# Patient Record
Sex: FEMALE | Race: WHITE | NOT HISPANIC OR LATINO | Employment: FULL TIME | URBAN - NONMETROPOLITAN AREA
[De-identification: names, ages, dates, MRNs, and addresses within clinical notes are randomized per-mention and may not be internally consistent; named-entity substitution may affect disease eponyms.]

---

## 2023-05-14 DIAGNOSIS — F32.9 MAJOR DEPRESSIVE DISORDER, SINGLE EPISODE, UNSPECIFIED: ICD-10-CM

## 2023-05-14 DIAGNOSIS — R05.9 COUGH, UNSPECIFIED TYPE: ICD-10-CM

## 2023-05-14 DIAGNOSIS — E78.00 PURE HYPERCHOLESTEROLEMIA, UNSPECIFIED: ICD-10-CM

## 2023-05-15 RX ORDER — ROSUVASTATIN CALCIUM 5 MG/1
TABLET, COATED ORAL
Qty: 30 TABLET | Refills: 0 | Status: SHIPPED | OUTPATIENT
Start: 2023-05-15 | End: 2023-06-12

## 2023-05-15 RX ORDER — CITALOPRAM 20 MG/1
TABLET, FILM COATED ORAL
Qty: 30 TABLET | Refills: 0 | Status: SHIPPED | OUTPATIENT
Start: 2023-05-15 | End: 2023-06-12

## 2023-05-22 RX ORDER — BENZONATATE 200 MG/1
200 CAPSULE ORAL 3 TIMES DAILY PRN
COMMUNITY
End: 2023-05-22 | Stop reason: SDUPTHER

## 2023-05-23 RX ORDER — BENZONATATE 200 MG/1
200 CAPSULE ORAL 3 TIMES DAILY PRN
Qty: 20 CAPSULE | Refills: 0 | Status: SHIPPED | OUTPATIENT
Start: 2023-05-23 | End: 2023-08-07 | Stop reason: ALTCHOICE

## 2023-05-25 ENCOUNTER — TELEPHONE (OUTPATIENT)
Dept: PRIMARY CARE | Facility: CLINIC | Age: 63
End: 2023-05-25
Payer: COMMERCIAL

## 2023-05-25 DIAGNOSIS — R05.1 ACUTE COUGH: Primary | ICD-10-CM

## 2023-05-25 RX ORDER — AZITHROMYCIN 250 MG/1
TABLET, FILM COATED ORAL
Qty: 6 TABLET | Refills: 0 | Status: SHIPPED | OUTPATIENT
Start: 2023-05-25 | End: 2023-08-07 | Stop reason: ALTCHOICE

## 2023-06-27 DIAGNOSIS — E11.9 TYPE 2 DIABETES MELLITUS WITHOUT COMPLICATIONS (MULTI): ICD-10-CM

## 2023-06-27 RX ORDER — METFORMIN HYDROCHLORIDE 500 MG/1
TABLET ORAL
Qty: 180 TABLET | Refills: 0 | Status: SHIPPED | OUTPATIENT
Start: 2023-06-27 | End: 2023-09-27

## 2023-07-14 DIAGNOSIS — R06.00 DYSPNEA, UNSPECIFIED TYPE: ICD-10-CM

## 2023-07-14 RX ORDER — BUDESONIDE AND FORMOTEROL FUMARATE DIHYDRATE 160; 4.5 UG/1; UG/1
2 AEROSOL RESPIRATORY (INHALATION)
Qty: 1 EACH | Refills: 2 | Status: SHIPPED | OUTPATIENT
Start: 2023-07-14 | End: 2023-10-18

## 2023-08-01 DIAGNOSIS — K21.9 GASTRO-ESOPHAGEAL REFLUX DISEASE WITHOUT ESOPHAGITIS: ICD-10-CM

## 2023-08-01 RX ORDER — OMEPRAZOLE 40 MG/1
40 CAPSULE, DELAYED RELEASE ORAL DAILY
Qty: 30 CAPSULE | Refills: 0 | Status: SHIPPED | OUTPATIENT
Start: 2023-08-01 | End: 2023-08-25

## 2023-08-02 NOTE — PROGRESS NOTES
Subjective   Patient ID:   Angela Boyce is a 62 y.o. female who presents for Leg Pain (Back of left leg worse at night, goes up leg into hip ).  HPI  Here with acute symptoms:  Symptoms x multiple months.  Left calf pains  Worse at night.  Was given Meloxicam without relief by urgent care.  Wakes her up at night.  Feels like muscle cramping.  Hurts to walk for a bit.    Review of Systems  12 point review of systems negative unless stated above in HPI    Vitals:    08/07/23 1524   BP: 121/80   Pulse: 93   Resp: 18   SpO2: 95%     Physical Exam  General: Alert and oriented, well nourished, no acute distress.  Lungs: Clear to auscultation, non-labored respiration.  Heart: Normal rate, regular rhythm, no murmur, gallop or edema.  Neurologic: Awake, alert, and oriented X3, CN II-XII intact.  Psychiatric: Cooperative, appropriate mood and affect.    Assessment/Plan   This appears to be a muscle cramp.  Please check your multivitamin at home and make sure you are getting potassium and magnesium.  I have sent in Flexeril to help with this.  Consider PT.  If symptoms persist or worsen despite current plan of care, please contact your healthcare provider for further evaluation.  Patient instructed to contact the office if there are any questions regarding their care or treatment.   [facility] [phone].  [facility] [phone].    Fu 4 months  Diagnoses and all orders for this visit:  Leg cramps  -     cyclobenzaprine (Flexeril) 10 mg tablet; Take 1 tablet (10 mg) by mouth as needed at bedtime for muscle spasms.  Muscle spasm of left lower extremity

## 2023-08-07 ENCOUNTER — OFFICE VISIT (OUTPATIENT)
Dept: PRIMARY CARE | Facility: CLINIC | Age: 63
End: 2023-08-07
Payer: COMMERCIAL

## 2023-08-07 VITALS
WEIGHT: 232.4 LBS | DIASTOLIC BLOOD PRESSURE: 80 MMHG | HEIGHT: 67 IN | SYSTOLIC BLOOD PRESSURE: 121 MMHG | RESPIRATION RATE: 18 BRPM | HEART RATE: 93 BPM | BODY MASS INDEX: 36.47 KG/M2 | OXYGEN SATURATION: 95 %

## 2023-08-07 DIAGNOSIS — M62.838 MUSCLE SPASM OF LEFT LOWER EXTREMITY: ICD-10-CM

## 2023-08-07 DIAGNOSIS — R25.2 LEG CRAMPS: Primary | ICD-10-CM

## 2023-08-07 PROBLEM — K62.5 BLEEDING PER RECTUM: Status: RESOLVED | Noted: 2023-08-07 | Resolved: 2023-08-07

## 2023-08-07 PROBLEM — E66.8 MODERATE OBESITY: Status: ACTIVE | Noted: 2023-08-07

## 2023-08-07 PROBLEM — E78.00 HYPERCHOLESTEROLEMIA: Status: ACTIVE | Noted: 2023-08-07

## 2023-08-07 PROBLEM — R07.89 CHEST TIGHTNESS: Status: RESOLVED | Noted: 2023-08-07 | Resolved: 2023-08-07

## 2023-08-07 PROBLEM — E66.9 MODERATE OBESITY: Status: ACTIVE | Noted: 2023-08-07

## 2023-08-07 PROBLEM — R73.9 HYPERGLYCEMIA: Status: ACTIVE | Noted: 2023-08-07

## 2023-08-07 PROBLEM — U07.1 COVID-19: Status: RESOLVED | Noted: 2023-08-07 | Resolved: 2023-08-07

## 2023-08-07 PROBLEM — J45.30 MILD PERSISTENT ASTHMA WITHOUT COMPLICATION (HHS-HCC): Status: ACTIVE | Noted: 2023-08-07

## 2023-08-07 PROBLEM — K21.9 ESOPHAGEAL REFLUX: Status: RESOLVED | Noted: 2023-08-07 | Resolved: 2023-08-07

## 2023-08-07 PROBLEM — L02.419 ABSCESS OF AXILLA: Status: RESOLVED | Noted: 2023-08-07 | Resolved: 2023-08-07

## 2023-08-07 PROBLEM — J30.9 ALLERGIC RHINITIS: Status: RESOLVED | Noted: 2023-08-07 | Resolved: 2023-08-07

## 2023-08-07 PROBLEM — R42 DIZZINESS: Status: RESOLVED | Noted: 2023-08-07 | Resolved: 2023-08-07

## 2023-08-07 PROBLEM — L03.116 CELLULITIS OF KNEE, LEFT: Status: RESOLVED | Noted: 2023-08-07 | Resolved: 2023-08-07

## 2023-08-07 PROBLEM — J45.909 ALLERGIC BRONCHITIS (HHS-HCC): Status: RESOLVED | Noted: 2023-08-07 | Resolved: 2023-08-07

## 2023-08-07 PROBLEM — I10 UNCONTROLLED HYPERTENSION: Status: ACTIVE | Noted: 2023-08-07

## 2023-08-07 PROBLEM — R79.89 ABNORMAL LFTS: Status: ACTIVE | Noted: 2023-08-07

## 2023-08-07 PROBLEM — F32.A DEPRESSION: Status: ACTIVE | Noted: 2023-08-07

## 2023-08-07 PROBLEM — R00.0 TACHYCARDIA: Status: RESOLVED | Noted: 2023-08-07 | Resolved: 2023-08-07

## 2023-08-07 PROBLEM — J45.909 ASTHMA (HHS-HCC): Status: ACTIVE | Noted: 2023-08-07

## 2023-08-07 PROBLEM — R06.02 SOB (SHORTNESS OF BREATH): Status: ACTIVE | Noted: 2023-08-07

## 2023-08-07 PROCEDURE — 1036F TOBACCO NON-USER: CPT | Performed by: PHYSICIAN ASSISTANT

## 2023-08-07 PROCEDURE — 3079F DIAST BP 80-89 MM HG: CPT | Performed by: PHYSICIAN ASSISTANT

## 2023-08-07 PROCEDURE — 99213 OFFICE O/P EST LOW 20 MIN: CPT | Performed by: PHYSICIAN ASSISTANT

## 2023-08-07 PROCEDURE — 3074F SYST BP LT 130 MM HG: CPT | Performed by: PHYSICIAN ASSISTANT

## 2023-08-07 RX ORDER — CYCLOBENZAPRINE HCL 10 MG
10 TABLET ORAL NIGHTLY PRN
Qty: 60 TABLET | Refills: 0 | Status: SHIPPED | OUTPATIENT
Start: 2023-08-07 | End: 2023-09-05

## 2023-08-07 RX ORDER — MELOXICAM 15 MG/1
15 TABLET ORAL DAILY
COMMUNITY
Start: 2023-07-03

## 2023-08-07 ASSESSMENT — PATIENT HEALTH QUESTIONNAIRE - PHQ9
SUM OF ALL RESPONSES TO PHQ9 QUESTIONS 1 AND 2: 0
2. FEELING DOWN, DEPRESSED OR HOPELESS: NOT AT ALL
1. LITTLE INTEREST OR PLEASURE IN DOING THINGS: NOT AT ALL

## 2023-08-07 ASSESSMENT — PAIN SCALES - GENERAL: PAINLEVEL: 4

## 2023-08-17 DIAGNOSIS — K21.9 GASTRO-ESOPHAGEAL REFLUX DISEASE WITHOUT ESOPHAGITIS: ICD-10-CM

## 2023-08-17 RX ORDER — OMEPRAZOLE 20 MG/1
CAPSULE, DELAYED RELEASE ORAL
Qty: 90 CAPSULE | Refills: 1 | Status: SHIPPED | OUTPATIENT
Start: 2023-08-17 | End: 2023-11-15 | Stop reason: SDUPTHER

## 2023-09-04 DIAGNOSIS — R25.2 LEG CRAMPS: ICD-10-CM

## 2023-09-05 RX ORDER — CYCLOBENZAPRINE HCL 10 MG
10 TABLET ORAL NIGHTLY PRN
Qty: 60 TABLET | Refills: 0 | Status: SHIPPED | OUTPATIENT
Start: 2023-09-05 | End: 2023-11-21

## 2023-09-08 DIAGNOSIS — E78.00 HYPERCHOLESTEROLEMIA: ICD-10-CM

## 2023-09-08 DIAGNOSIS — F32.9 MAJOR DEPRESSIVE DISORDER, SINGLE EPISODE, UNSPECIFIED: ICD-10-CM

## 2023-09-08 RX ORDER — CITALOPRAM 20 MG/1
TABLET, FILM COATED ORAL
Qty: 90 TABLET | Refills: 0 | Status: SHIPPED | OUTPATIENT
Start: 2023-09-08 | End: 2024-01-02 | Stop reason: SDUPTHER

## 2023-09-21 RX ORDER — ROSUVASTATIN CALCIUM 5 MG/1
5 TABLET, COATED ORAL DAILY
Qty: 90 TABLET | Refills: 0 | Status: SHIPPED | OUTPATIENT
Start: 2023-09-21 | End: 2023-12-18

## 2023-10-18 DIAGNOSIS — R06.00 DYSPNEA, UNSPECIFIED TYPE: ICD-10-CM

## 2023-10-18 RX ORDER — BUDESONIDE AND FORMOTEROL FUMARATE DIHYDRATE 160; 4.5 UG/1; UG/1
2 AEROSOL RESPIRATORY (INHALATION) 2 TIMES DAILY
Qty: 10.2 EACH | Refills: 2 | Status: SHIPPED | OUTPATIENT
Start: 2023-10-18 | End: 2024-01-16

## 2023-11-15 ENCOUNTER — OFFICE VISIT (OUTPATIENT)
Dept: PRIMARY CARE | Facility: CLINIC | Age: 63
End: 2023-11-15
Payer: COMMERCIAL

## 2023-11-15 VITALS
HEART RATE: 101 BPM | BODY MASS INDEX: 36.41 KG/M2 | WEIGHT: 232 LBS | HEIGHT: 67 IN | DIASTOLIC BLOOD PRESSURE: 80 MMHG | RESPIRATION RATE: 16 BRPM | SYSTOLIC BLOOD PRESSURE: 132 MMHG | OXYGEN SATURATION: 97 %

## 2023-11-15 DIAGNOSIS — K21.9 GASTROESOPHAGEAL REFLUX DISEASE WITHOUT ESOPHAGITIS: ICD-10-CM

## 2023-11-15 DIAGNOSIS — J45.909 ASTHMA, UNSPECIFIED ASTHMA SEVERITY, UNSPECIFIED WHETHER COMPLICATED, UNSPECIFIED WHETHER PERSISTENT (HHS-HCC): ICD-10-CM

## 2023-11-15 DIAGNOSIS — M54.50 LOW BACK PAIN, UNSPECIFIED BACK PAIN LATERALITY, UNSPECIFIED CHRONICITY, UNSPECIFIED WHETHER SCIATICA PRESENT: ICD-10-CM

## 2023-11-15 DIAGNOSIS — E01.0 THYROMEGALY: ICD-10-CM

## 2023-11-15 DIAGNOSIS — E11.9 TYPE 2 DIABETES MELLITUS WITHOUT COMPLICATION, UNSPECIFIED WHETHER LONG TERM INSULIN USE (MULTI): ICD-10-CM

## 2023-11-15 DIAGNOSIS — E78.5 DYSLIPIDEMIA: ICD-10-CM

## 2023-11-15 DIAGNOSIS — R73.9 HYPERGLYCEMIA: ICD-10-CM

## 2023-11-15 DIAGNOSIS — F32.A DEPRESSION, UNSPECIFIED DEPRESSION TYPE: ICD-10-CM

## 2023-11-15 PROBLEM — J45.20 MILD INTERMITTENT ASTHMA WITHOUT COMPLICATION (HHS-HCC): Status: ACTIVE | Noted: 2023-11-15

## 2023-11-15 LAB — POC HEMOGLOBIN A1C: 7.5 % (ref 4.2–6.5)

## 2023-11-15 PROCEDURE — 99214 OFFICE O/P EST MOD 30 MIN: CPT | Performed by: INTERNAL MEDICINE

## 2023-11-15 PROCEDURE — 83036 HEMOGLOBIN GLYCOSYLATED A1C: CPT | Performed by: INTERNAL MEDICINE

## 2023-11-15 PROCEDURE — 3079F DIAST BP 80-89 MM HG: CPT | Performed by: INTERNAL MEDICINE

## 2023-11-15 PROCEDURE — 1036F TOBACCO NON-USER: CPT | Performed by: INTERNAL MEDICINE

## 2023-11-15 PROCEDURE — 3075F SYST BP GE 130 - 139MM HG: CPT | Performed by: INTERNAL MEDICINE

## 2023-11-15 RX ORDER — METFORMIN HYDROCHLORIDE 750 MG/1
750 TABLET, EXTENDED RELEASE ORAL
Qty: 30 TABLET | Refills: 11 | Status: SHIPPED | OUTPATIENT
Start: 2023-11-15 | End: 2024-01-31 | Stop reason: SDUPTHER

## 2023-11-15 ASSESSMENT — PAIN SCALES - GENERAL: PAINLEVEL: 6

## 2023-11-15 ASSESSMENT — PATIENT HEALTH QUESTIONNAIRE - PHQ9
SUM OF ALL RESPONSES TO PHQ9 QUESTIONS 1 AND 2: 0
1. LITTLE INTEREST OR PLEASURE IN DOING THINGS: NOT AT ALL
2. FEELING DOWN, DEPRESSED OR HOPELESS: NOT AT ALL

## 2023-11-15 NOTE — PROGRESS NOTES
Subjective   Patient ID:   Angela Boyce is a 62 y.o. female who presents for Annual Exam.  HPI  Pain scale: 6:    Living will? Yes  POA? Yes:    Are you currently or have you recently been threatened or abused? No  Do you feel unsafe going back to the place you are living? No  Reported health: Good  Dental visits? Yes  Hearing problems? Yes - wears hearing aids  Vision problems? Yes - wears glasses  Healthy diet? Yes  Exercise? Exercise 4-5x per week  Adequate fluid intake? Yes    Social History     Tobacco Use    Smoking status: Never    Smokeless tobacco: Never   Substance Use Topics    Alcohol use: Yes       Immunization History   Administered Date(s) Administered    Influenza, injectable, MDCK, preservative free, quadrivalent 09/21/2022    Influenza, seasonal, injectable 09/26/2018    Pfizer Purple Cap SARS-CoV-2 02/25/2021, 03/19/2021, 11/11/2021       Review of Systems  12 point review of systems negative unless stated above in HPI    Vitals:    11/15/23 1459   BP: 132/80   Pulse: 101   Resp: 16   SpO2: 97%       Physical Exam  General: Alert and oriented, well nourished, no acute distress.  Lungs: Clear to auscultation, non-labored respiration.  Heart: Normal rate, regular rhythm, no murmur, gallop or edema.  Neurologic: Awake, alert, and oriented X3, CN II-XII intact.  Psychiatric: Cooperative, appropriate mood and affect.    Assessment/Plan   Problem List Items Addressed This Visit    None

## 2023-11-15 NOTE — PROGRESS NOTES
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 She states sitting for long periods seems to aggravate it. She denies any SOB or chest pain. She states she is urinating a lot. She states sometimes she has to rush to the bathroom to urinate. She states she gets occasional headaches, but she has attributed that to needing to eat something, because after she eats, HA resolves.     Angela Boyce is a 62 y.o. female with PMH remarkable for  who presents to the office today for Annual Exam (Back pain) and Back Pain.    HEALTH MAINTENANCE: Annual Wellness Physical  Smoking: does not smoke but she is exposed to it from her   Last Colonoscopy (45-75): 2018 with Dr Pfeiffer, showed diverticulosis at that time  Dexa Scan (65+, q2yrs): 1/25/23 revealed osteopenia  Mammogram (40-75): 1/25/23 WNL  Labs: 2/8/23  HgbA1c was 6.6 on 01/11/23  EKG: done 2020  cardiac stress test in 2018 that was negative  PFT in 2020    SOCIAL HISTORY:  Social History     Tobacco Use    Smoking status: Never    Smokeless tobacco: Never   Substance Use Topics    Alcohol use: Yes     IMMUNIZATIONS:  Immunization History   Administered Date(s) Administered    Influenza, injectable, MDCK, preservative free, quadrivalent 09/21/2022, 10/25/2023    Influenza, seasonal, injectable 09/26/2018    Pfizer COVID-19 vaccine, Fall 2023, 12 years and older, (30mcg/0.3mL) 10/23/2023    Pfizer Purple Cap SARS-CoV-2 02/25/2021, 03/19/2021, 11/11/2021     REVIEW OF SYSTEMS:  Review of Systems  12 point review of systems negative unless stated above in HPI    ALLERGIES:  No Known Allergies     VITAL SIGNS:  Vitals:    11/15/23 1459   BP: 132/80   Pulse: 101   Resp: 16   SpO2: 97%     PHYSICAL EXAM:  Physical Exam  Vitals reviewed.   Constitutional:       Appearance: Normal appearance.   HENT:      Head: Normocephalic and atraumatic.   Neck:      Thyroid:  Thyromegaly present.   Cardiovascular:      Rate and Rhythm: Normal rate and regular rhythm.      Pulses: Normal pulses.      Heart sounds: Normal heart sounds.   Pulmonary:      Effort: Pulmonary effort is normal.      Breath sounds: Normal breath sounds.   Abdominal:      General: Bowel sounds are normal.      Palpations: Abdomen is soft.   Musculoskeletal:         General: Tenderness present.   Neurological:      General: No focal deficit present.      Mental Status: She is alert and oriented to person, place, and time.   Psychiatric:         Mood and Affect: Mood normal.         Behavior: Behavior normal.         MEDICATIONS:  Current Outpatient Medications on File Prior to Visit   Medication Sig Dispense Refill    citalopram (CeleXA) 20 mg tablet TAKE 1 TABLET BY MOUTH EVERY DAY 90 tablet 0    glucosamine sulfate 500 mg capsule Take 500 mg by mouth once daily.      metFORMIN (Glucophage) 500 mg tablet TAKE 1 TABLET BY MOUTH TWICE A  tablet 0    montelukast (Singulair) 10 mg tablet Take 1 tablet (10 mg) by mouth once daily.      multivitamin tablet Take 1 tablet by mouth once daily.      omeprazole (PriLOSEC) 40 mg DR capsule TAKE 1 CAPSULE BY MOUTH ONCE DAILY 90 capsule 0    rosuvastatin (Crestor) 5 mg tablet Take 1 tablet (5 mg) by mouth once daily. 90 tablet 0    Symbicort 160-4.5 mcg/actuation inhaler INHALE 2 PUFFS BY MOUTH TWICE A DAY 10.2 each 2    cyclobenzaprine (Flexeril) 10 mg tablet TAKE 1 TABLET BY MOUTH AT BEDTIME AS NEEDED FOR MUSCLE SPASMS (Patient not taking: Reported on 11/15/2023) 60 tablet 0    meloxicam (Mobic) 15 mg tablet Take 1 tablet (15 mg) by mouth once daily.      omeprazole (PriLOSEC) 20 mg DR capsule TAKE 1 CAPSULE BY MOUTH ONCE DAILY 30 MINUTES BEFORE A MEAL (Patient not taking: Reported on 11/15/2023) 90 capsule 1     No current facility-administered medications on file prior to visit.      LABORATORY DATA:  Lab Results   Component Value Date    WBC 6.6 02/08/2023    HGB  15.2 02/08/2023    HCT 45.9 02/08/2023     02/08/2023    CHOL 182 02/08/2023    TRIG 133 02/08/2023    HDL 66.0 02/08/2023    ALT 26 02/08/2023    AST 19 02/08/2023     02/08/2023    K 4.2 02/08/2023     02/08/2023    CREATININE 0.61 02/08/2023    BUN 14 02/08/2023    CO2 27 02/08/2023    TSH 2.10 02/08/2023    HGBA1C 6.7 (A) 11/03/2021     ASSESSMENT AND PLAN:  Healthcare Maintenance: Physical  - reviewed most recent bloodwork from 2/8/23, all WNL other than slightly elevated glucose and elevated B12  - she states she is retiring in 18 days, then is going out of state for winter  - she has lower back pain, advised to continue with Tylenol PRN, she is not taking Flexeril or Meloxicam  - Follow up in four months     Thyromegaly  - US of thyroid for further evaluation    DM2  - hgbA1c was 6.6 on 01/11/23  - today, hgbA1c is slightly more elevated at 7.5  - continue with metformin, she admits she usually only takes it once daily, forgets second dose,  will increase to Metformin XL 750mg daily     Anxiety  - stable  - continue with Celexa     Asthma  - stable  - continue with singular and symbicort  -she was advised on last exam to make sure she takes Symbicort regularly     GERD  - continue with PPI     HLD  - continue with statin  --------------------  Written by Karen Pa LPN, acting as a scribe for Dr. Domingo. This note accurately reflects the work and decisions made by Dr. Domingo.     I, Dr. Domingo, attest all medical record entries made by the scribe were under my direction and were personally dictated by me. I have reviewed the chart and agree that the record accurately reflects my performance of the history, physical exam, and assessment and plan.

## 2023-11-16 NOTE — PATIENT INSTRUCTIONS
It was great to see you in the office today! Here is what we discussed at your visit today:  Please continue to take your current medications   WE have placed an order for an ultrasound of your thyroid, please schedule as soon as you are able. We will call you with results  Increase metformin to long acting 750mg tablet daily to replace current dose, new prescription sent in  Follow up in four months

## 2023-11-17 DIAGNOSIS — R25.2 LEG CRAMPS: ICD-10-CM

## 2023-11-17 DIAGNOSIS — K21.9 GASTRO-ESOPHAGEAL REFLUX DISEASE WITHOUT ESOPHAGITIS: ICD-10-CM

## 2023-11-21 ENCOUNTER — HOSPITAL ENCOUNTER (OUTPATIENT)
Dept: RADIOLOGY | Facility: HOSPITAL | Age: 63
Discharge: HOME | End: 2023-11-21
Payer: COMMERCIAL

## 2023-11-21 DIAGNOSIS — E01.0 THYROMEGALY: ICD-10-CM

## 2023-11-21 PROCEDURE — 76536 US EXAM OF HEAD AND NECK: CPT | Performed by: RADIOLOGY

## 2023-11-21 PROCEDURE — 76536 US EXAM OF HEAD AND NECK: CPT

## 2023-11-21 RX ORDER — CYCLOBENZAPRINE HCL 10 MG
10 TABLET ORAL NIGHTLY PRN
Qty: 60 TABLET | Refills: 0 | Status: SHIPPED | OUTPATIENT
Start: 2023-11-21 | End: 2024-01-22

## 2023-11-21 RX ORDER — OMEPRAZOLE 40 MG/1
40 CAPSULE, DELAYED RELEASE ORAL DAILY
Qty: 90 CAPSULE | Refills: 0 | Status: SHIPPED | OUTPATIENT
Start: 2023-11-21 | End: 2024-01-31 | Stop reason: SDUPTHER

## 2023-12-18 DIAGNOSIS — E78.00 HYPERCHOLESTEROLEMIA: ICD-10-CM

## 2023-12-18 RX ORDER — ROSUVASTATIN CALCIUM 5 MG/1
5 TABLET, COATED ORAL DAILY
Qty: 90 TABLET | Refills: 0 | Status: SHIPPED | OUTPATIENT
Start: 2023-12-18 | End: 2024-01-31 | Stop reason: SDUPTHER

## 2024-01-02 DIAGNOSIS — F32.9 MAJOR DEPRESSIVE DISORDER, SINGLE EPISODE, UNSPECIFIED: ICD-10-CM

## 2024-01-02 RX ORDER — CITALOPRAM 20 MG/1
20 TABLET, FILM COATED ORAL DAILY
Qty: 90 TABLET | Refills: 0 | Status: SHIPPED | OUTPATIENT
Start: 2024-01-02 | End: 2024-01-31 | Stop reason: SDUPTHER

## 2024-01-15 ENCOUNTER — DOCUMENTATION (OUTPATIENT)
Dept: PRIMARY CARE | Facility: CLINIC | Age: 64
End: 2024-01-15
Payer: COMMERCIAL

## 2024-01-16 DIAGNOSIS — R06.00 DYSPNEA, UNSPECIFIED TYPE: ICD-10-CM

## 2024-01-16 RX ORDER — BUDESONIDE AND FORMOTEROL FUMARATE DIHYDRATE 160; 4.5 UG/1; UG/1
2 AEROSOL RESPIRATORY (INHALATION) 2 TIMES DAILY
Qty: 10.2 EACH | Refills: 2 | Status: SHIPPED | OUTPATIENT
Start: 2024-01-16

## 2024-01-19 DIAGNOSIS — R25.2 LEG CRAMPS: ICD-10-CM

## 2024-01-22 RX ORDER — CYCLOBENZAPRINE HCL 10 MG
10 TABLET ORAL NIGHTLY PRN
Qty: 60 TABLET | Refills: 0 | Status: SHIPPED | OUTPATIENT
Start: 2024-01-22

## 2024-01-31 DIAGNOSIS — E11.9 TYPE 2 DIABETES MELLITUS WITHOUT COMPLICATION, UNSPECIFIED WHETHER LONG TERM INSULIN USE (MULTI): ICD-10-CM

## 2024-01-31 DIAGNOSIS — E78.00 HYPERCHOLESTEROLEMIA: ICD-10-CM

## 2024-01-31 DIAGNOSIS — K21.9 GASTRO-ESOPHAGEAL REFLUX DISEASE WITHOUT ESOPHAGITIS: ICD-10-CM

## 2024-01-31 DIAGNOSIS — J45.30 MILD PERSISTENT ASTHMA WITHOUT COMPLICATION (HHS-HCC): ICD-10-CM

## 2024-01-31 DIAGNOSIS — F32.9 MAJOR DEPRESSIVE DISORDER, SINGLE EPISODE, UNSPECIFIED: ICD-10-CM

## 2024-01-31 RX ORDER — MONTELUKAST SODIUM 10 MG/1
10 TABLET ORAL DAILY
Qty: 90 TABLET | Refills: 0 | Status: SHIPPED | OUTPATIENT
Start: 2024-01-31

## 2024-01-31 RX ORDER — OMEPRAZOLE 40 MG/1
40 CAPSULE, DELAYED RELEASE ORAL DAILY
Qty: 90 CAPSULE | Refills: 0 | Status: SHIPPED | OUTPATIENT
Start: 2024-01-31 | End: 2024-05-29

## 2024-01-31 RX ORDER — METFORMIN HYDROCHLORIDE 750 MG/1
750 TABLET, EXTENDED RELEASE ORAL
Qty: 90 TABLET | Refills: 0 | Status: SHIPPED | OUTPATIENT
Start: 2024-01-31 | End: 2024-06-10 | Stop reason: SDUPTHER

## 2024-01-31 RX ORDER — CITALOPRAM 20 MG/1
20 TABLET, FILM COATED ORAL DAILY
Qty: 90 TABLET | Refills: 0 | Status: SHIPPED | OUTPATIENT
Start: 2024-01-31

## 2024-01-31 RX ORDER — ROSUVASTATIN CALCIUM 5 MG/1
5 TABLET, COATED ORAL DAILY
Qty: 90 TABLET | Refills: 0 | Status: SHIPPED | OUTPATIENT
Start: 2024-01-31 | End: 2024-06-10 | Stop reason: SDUPTHER

## 2024-05-29 DIAGNOSIS — K21.9 GASTRO-ESOPHAGEAL REFLUX DISEASE WITHOUT ESOPHAGITIS: ICD-10-CM

## 2024-05-29 DIAGNOSIS — E11.9 TYPE 2 DIABETES MELLITUS WITHOUT COMPLICATION, UNSPECIFIED WHETHER LONG TERM INSULIN USE (MULTI): ICD-10-CM

## 2024-05-29 DIAGNOSIS — E78.00 HYPERCHOLESTEROLEMIA: ICD-10-CM

## 2024-05-29 RX ORDER — OMEPRAZOLE 40 MG/1
40 CAPSULE, DELAYED RELEASE ORAL DAILY
Qty: 90 CAPSULE | Refills: 0 | Status: SHIPPED | OUTPATIENT
Start: 2024-05-29 | End: 2024-05-29 | Stop reason: SDUPTHER

## 2024-05-30 RX ORDER — OMEPRAZOLE 40 MG/1
40 CAPSULE, DELAYED RELEASE ORAL DAILY
Qty: 90 CAPSULE | Refills: 0 | Status: SHIPPED | OUTPATIENT
Start: 2024-05-30

## 2024-06-10 RX ORDER — ROSUVASTATIN CALCIUM 5 MG/1
5 TABLET, COATED ORAL DAILY
Qty: 90 TABLET | Refills: 0 | Status: SHIPPED | OUTPATIENT
Start: 2024-06-10 | End: 2024-06-11

## 2024-06-10 RX ORDER — METFORMIN HYDROCHLORIDE 750 MG/1
750 TABLET, EXTENDED RELEASE ORAL
Qty: 90 TABLET | Refills: 0 | Status: SHIPPED | OUTPATIENT
Start: 2024-06-10 | End: 2024-06-11

## 2024-06-11 DIAGNOSIS — E78.00 HYPERCHOLESTEROLEMIA: ICD-10-CM

## 2024-06-11 DIAGNOSIS — E11.9 TYPE 2 DIABETES MELLITUS WITHOUT COMPLICATION, UNSPECIFIED WHETHER LONG TERM INSULIN USE (MULTI): ICD-10-CM

## 2024-06-11 RX ORDER — METFORMIN HYDROCHLORIDE 750 MG/1
750 TABLET, EXTENDED RELEASE ORAL
Qty: 90 TABLET | Refills: 0 | Status: SHIPPED | OUTPATIENT
Start: 2024-06-11

## 2024-06-11 RX ORDER — ROSUVASTATIN CALCIUM 5 MG/1
5 TABLET, COATED ORAL DAILY
Qty: 90 TABLET | Refills: 0 | Status: SHIPPED | OUTPATIENT
Start: 2024-06-11

## 2024-06-24 DIAGNOSIS — F32.9 MAJOR DEPRESSIVE DISORDER, SINGLE EPISODE, UNSPECIFIED: ICD-10-CM

## 2024-06-24 RX ORDER — CITALOPRAM 20 MG/1
20 TABLET, FILM COATED ORAL DAILY
Qty: 90 TABLET | Refills: 0 | Status: SHIPPED | OUTPATIENT
Start: 2024-06-24

## 2024-08-15 PROBLEM — I15.2 HYPERTENSION DUE TO ENDOCRINE DISORDER: Status: ACTIVE | Noted: 2023-08-07

## 2024-08-15 PROBLEM — J45.909 ASTHMA (HHS-HCC): Status: RESOLVED | Noted: 2023-08-07 | Resolved: 2024-08-15

## 2024-08-15 PROBLEM — R06.02 SOB (SHORTNESS OF BREATH): Status: RESOLVED | Noted: 2023-08-07 | Resolved: 2024-08-15

## 2024-08-15 PROBLEM — R73.9 HYPERGLYCEMIA: Status: RESOLVED | Noted: 2023-08-07 | Resolved: 2024-08-15

## 2024-08-15 PROBLEM — J45.30 MILD PERSISTENT ASTHMA WITHOUT COMPLICATION (HHS-HCC): Status: RESOLVED | Noted: 2023-08-07 | Resolved: 2024-08-15

## 2024-08-15 NOTE — PROGRESS NOTES
"Patient ID:   Angela Boyce is a 63 y.o. female with PMH remarkable for DM2, HTN, HLD, depression, diverticulosis, asthma who presents to the office today for Welcome To Medicare.    HEALTH MAINTENANCE: Welcome to Medicare Wellness Physical  Last OV: 11/15/2023 for her annual exam and c/o back pain.   Mammogram (40-75): 1/25/2023 - ve --> DUE  Pap smear (21-65, or hysterectomy q5yrs): 2020 -ve  Last Labs: 2/8/2023 --> DUE  HgbA1c 7.5 on 11/15/2023 --> DUE 7.2 today  Colonoscopy (45-75 or age 40 with 1st degree relative dx colon ca): 2018 showing diverticulosis and non bleeding internal hemorrhoids with Dr Ramsay  DEXA (65+, q 2 years):  1/25/2023 osteopenia left femoral neck  Cardiac Stress Test 2018 -ve  US thyroid 11/21/2023 showed 1.0 diameter mildly suspicious thyroid nodule within the inferior aspect of the left lobe of the thyroid gland.   PFT in 2020  - retired in December 2023- she was a  at [employer]    Social History     Tobacco Use    Smoking status: Never    Smokeless tobacco: Never   Substance Use Topics    Alcohol use: Not Currently    Drug use: Never       Immunization History   Administered Date(s) Administered    Flu vaccine, quadrivalent, no egg protein, age 6 month or greater (FLUCELVAX) 09/21/2022, 10/25/2023    Influenza, seasonal, injectable 09/26/2018    Pfizer COVID-19 vaccine, Fall 2023, 12 years and older, (30mcg/0.3mL) 10/23/2023    Pfizer Purple Cap SARS-CoV-2 02/25/2021, 03/19/2021, 11/11/2021    RSV, 60 Years And Older (AREXVY) 01/12/2024       Review of Systems   All other systems reviewed and are negative.      No Known Allergies    Visit Vitals  /79 (BP Location: Left arm, Patient Position: Sitting)   Pulse 93   Resp 18   Ht 1.702 m (5' 7\")   Wt 106 kg (234 lb)   SpO2 93%   BMI 36.65 kg/m²   Smoking Status Never   BSA 2.24 m²     Physical Exam  Vitals reviewed.   Constitutional:       Appearance: Normal appearance.   HENT:      Head: Normocephalic and " atraumatic.   Neck:      Thyroid: Thyromegaly present.   Cardiovascular:      Rate and Rhythm: Normal rate and regular rhythm.      Pulses: Normal pulses.      Heart sounds: Normal heart sounds.   Pulmonary:      Effort: Pulmonary effort is normal.      Breath sounds: Normal breath sounds.   Abdominal:      General: Bowel sounds are normal.      Palpations: Abdomen is soft.   Musculoskeletal:         General: Tenderness present.   Neurological:      General: No focal deficit present.      Mental Status: She is alert and oriented to person, place, and time.   Psychiatric:         Mood and Affect: Mood normal.         Behavior: Behavior normal.         Current Outpatient Medications   Medication Instructions    citalopram (CELEXA) 20 mg, oral, Daily    cyclobenzaprine (FLEXERIL) 10 mg, oral, Nightly PRN    glucosamine sulfate 500 mg, oral, Daily    metFORMIN XR (GLUCOPHAGE-XR) 750 mg, oral, Daily with evening meal, Do not crush, chew, or split    montelukast (SINGULAIR) 10 mg, oral, Daily    multivitamin tablet 1 tablet, oral, Daily    omeprazole (PRILOSEC) 40 mg, oral, Daily    rosuvastatin (CRESTOR) 5 mg, oral, Daily    Symbicort 160-4.5 mcg/actuation inhaler 2 puffs, 2 times daily       Lab Results   Component Value Date    WBC 6.6 02/08/2023    HGB 15.2 02/08/2023    HCT 45.9 02/08/2023     02/08/2023    CHOL 182 02/08/2023    TRIG 133 02/08/2023    HDL 66.0 02/08/2023    ALT 26 02/08/2023    AST 19 02/08/2023     02/08/2023    K 4.2 02/08/2023     02/08/2023    CREATININE 0.61 02/08/2023    BUN 14 02/08/2023    CO2 27 02/08/2023    TSH 2.10 02/08/2023    HGBA1C 7.2 (A) 08/16/2024     Problem List Items Addressed This Visit             ICD-10-CM    Depression F32.A     - c/w celexa         Hypercholesterolemia E78.00     - c/w statin  - check lipid panel         Relevant Orders    Lipid Panel    Gastroesophageal reflux disease without esophagitis K21.9     - c/w PPI         Dyslipidemia E78.5     Relevant Orders    Lipid Panel    Mild intermittent asthma without complication ([facility]) J45.20     - c/w symbicort inhaler and singular         Welcome to Medicare preventive visit Z00.00     - will order mammogram and lab work         Relevant Orders    CBC and Auto Differential    Comprehensive Metabolic Panel    Lipid Panel    TSH with reflex to Free T4 if abnormal    Vitamin D 25-Hydroxy,Total (for eval of Vitamin D levels)    Vitamin B12    POCT glycosylated hemoglobin (Hb A1C) manually resulted (Completed)    ECG 12 lead (Clinic Performed)    Type 2 diabetes mellitus without complication (Multi) E11.9     HgbA1c 7.5 on 11/15/2023 --> DUE 7.2 today  - c/w metformin XR         Relevant Orders    POCT glycosylated hemoglobin (Hb A1C) manually resulted (Completed)     Other Visit Diagnoses         Codes    Abnormal finding of blood chemistry, unspecified     R79.9    Relevant Orders    CBC and Auto Differential    Body mass index (BMI) 36.0-36.9, adult     Z68.36    Relevant Orders    Vitamin D 25-Hydroxy,Total (for eval of Vitamin D levels)    Breast screening     Z12.39    Relevant Orders    BI mammo bilateral screening tomosynthesis    Encounter for screening mammogram for malignant neoplasm of breast     Z12.31    Relevant Orders    BI mammo bilateral screening tomosynthesis          --------------------  Written by Sneha Madrigal RN, acting as a scribe for Dr. Domingo. This note accurately reflects the work and decisions made by Dr. Domingo.     I, Dr. Domingo, attest all medical record entries made by the scribe were under my direction and were personally dictated by me. I have reviewed the chart and agree that the record accurately reflects my performance of the history, physical exam, and assessment and plan. Patient was identified as a fall risk. Risk prevention instructions provided.

## 2024-08-15 NOTE — PATIENT INSTRUCTIONS
It was nice to see you in the office today!  As discussed during our visit...     Dr Domingo has ordered a mammogram for you to obtain. This will take approximately 30 minutes. You can schedule this on the [facility] website or through the link below.    Breast cancer cannot be prevented but early detection can keep the disease from spreading and greatly increase your odds for a favorable outcome.    Patients with dense breast tissue, those who have an average or mildly elevated risk for developing breast cancer or those who have a family history of the disease can schedule a low-cost breast cancer screening that takes approximately 10 minutes. This supplemental screening is self-pay ($250*) and is available at several  locations that perform breast MRIs. To learn more about the Fast MRI program, call [phone].    Website with more information and access to schedule online:  [email or web address]   -----------------------------------------------  Please have your blood drawn in the next 1-2 weeks.   You need to be FASTING for 12 hours prior to blood draw.  You may have BLACK coffee, BLACK tea and/or water at any time during the fasting time.  We will contact you with the results of your blood work and any necessary adjustments to your plan of care.  If you do not hear from us within 3-5 days of having your blood drawn, please call the [place] office at [phone].     The two closest Outpatient Lab's to this office is:  [facility]  [address] [place] [address]    Hours:   Monday through Friday 7:30am - 4:30pm (Closed 12:30-1pm for lunch)     Or you can go to ...  [facility]  [address]  [place] [address] [phone]    Hours:   Monday through Friday 7:30am - 4:30pm (Closed 12:30-1pm for lunch)   Saturday 8am - 12pm    Website to confirm hours and location OR look up more  locations ... [email or web address]         Ways to Help Prevent Falls at Home    Quick Tips   ? Ask for help if you need it. Most people want to help!   ? Get up slowly after sitting or laying down   ? Wear a medical alert device or keep cell phone in your pocket   ? Use night lights, especially areas near a bathroom   ? Keep the items you use often within reach on a small stool or end table   ? Use an assistive device such as walker or cane, as directed by provider/physical therapy   ? Use a non-slip mat and grab bars in your bathroom. Look for home health sections for best options     Other Areas to Focus On   ? Exercise and nutrition: Regular exercise or taking a falls prevention class are great ways improve strength and balance. Don’t forget to stay hydrated and bring a snack!   ? Medicine side effects: Some medicines can make you sleepy or dizzy, which could cause a fall. Ask your healthcare provider about the side effects your medicines could cause. Be sure to let them know if you take any vitamins or supplements as well.   ? Tripping hazards: Remove items you could trip on, such as loose mats, rugs, cords, and clutter. Wear closed toe shoes with rubber soles.   ? Health and wellness: Get regular checkups with your healthcare provider, plus routine vision and hearing screenings. Talk with your healthcare provider about:   o Your medicines and the possible side effects - bring them in a bag if that is easier!   o Problems with balance or feeling dizzy   o Ways to promote bone health, such as Vitamin D and calcium supplements   o Questions or concerns about falling     *Ask your healthcare team if you have questions     [facility], 2022

## 2024-08-16 ENCOUNTER — APPOINTMENT (OUTPATIENT)
Dept: PRIMARY CARE | Facility: CLINIC | Age: 64
End: 2024-08-16
Payer: COMMERCIAL

## 2024-08-16 VITALS
SYSTOLIC BLOOD PRESSURE: 119 MMHG | OXYGEN SATURATION: 93 % | HEART RATE: 93 BPM | BODY MASS INDEX: 36.73 KG/M2 | WEIGHT: 234 LBS | HEIGHT: 67 IN | DIASTOLIC BLOOD PRESSURE: 79 MMHG | RESPIRATION RATE: 18 BRPM

## 2024-08-16 DIAGNOSIS — Z12.39 BREAST SCREENING: ICD-10-CM

## 2024-08-16 DIAGNOSIS — Z00.00 WELCOME TO MEDICARE PREVENTIVE VISIT: ICD-10-CM

## 2024-08-16 DIAGNOSIS — Z12.31 ENCOUNTER FOR SCREENING MAMMOGRAM FOR MALIGNANT NEOPLASM OF BREAST: ICD-10-CM

## 2024-08-16 DIAGNOSIS — F32.A DEPRESSION, UNSPECIFIED DEPRESSION TYPE: ICD-10-CM

## 2024-08-16 DIAGNOSIS — K21.9 GASTROESOPHAGEAL REFLUX DISEASE WITHOUT ESOPHAGITIS: ICD-10-CM

## 2024-08-16 DIAGNOSIS — J45.20 MILD INTERMITTENT ASTHMA WITHOUT COMPLICATION (HHS-HCC): ICD-10-CM

## 2024-08-16 DIAGNOSIS — E11.9 TYPE 2 DIABETES MELLITUS WITHOUT COMPLICATION, UNSPECIFIED WHETHER LONG TERM INSULIN USE (MULTI): ICD-10-CM

## 2024-08-16 DIAGNOSIS — R79.9 ABNORMAL FINDING OF BLOOD CHEMISTRY, UNSPECIFIED: ICD-10-CM

## 2024-08-16 DIAGNOSIS — E78.5 DYSLIPIDEMIA: ICD-10-CM

## 2024-08-16 DIAGNOSIS — E78.00 HYPERCHOLESTEROLEMIA: ICD-10-CM

## 2024-08-16 LAB — POC HEMOGLOBIN A1C: 7.2 % (ref 4.2–6.5)

## 2024-08-16 PROCEDURE — 3078F DIAST BP <80 MM HG: CPT | Performed by: INTERNAL MEDICINE

## 2024-08-16 PROCEDURE — 1170F FXNL STATUS ASSESSED: CPT | Performed by: INTERNAL MEDICINE

## 2024-08-16 PROCEDURE — 1126F AMNT PAIN NOTED NONE PRSNT: CPT | Performed by: INTERNAL MEDICINE

## 2024-08-16 PROCEDURE — G0402 INITIAL PREVENTIVE EXAM: HCPCS | Performed by: INTERNAL MEDICINE

## 2024-08-16 PROCEDURE — 83036 HEMOGLOBIN GLYCOSYLATED A1C: CPT | Performed by: INTERNAL MEDICINE

## 2024-08-16 PROCEDURE — 3048F LDL-C <100 MG/DL: CPT | Performed by: INTERNAL MEDICINE

## 2024-08-16 PROCEDURE — 1159F MED LIST DOCD IN RCRD: CPT | Performed by: INTERNAL MEDICINE

## 2024-08-16 PROCEDURE — 3074F SYST BP LT 130 MM HG: CPT | Performed by: INTERNAL MEDICINE

## 2024-08-16 PROCEDURE — 3008F BODY MASS INDEX DOCD: CPT | Performed by: INTERNAL MEDICINE

## 2024-08-16 ASSESSMENT — PAIN SCALES - GENERAL: PAINLEVEL: 0-NO PAIN

## 2024-08-16 ASSESSMENT — ACTIVITIES OF DAILY LIVING (ADL)
MANAGING_FINANCES: INDEPENDENT
GROCERY_SHOPPING: INDEPENDENT
DRESSING: INDEPENDENT
DOING_HOUSEWORK: INDEPENDENT
BATHING: INDEPENDENT
TAKING_MEDICATION: INDEPENDENT

## 2024-08-16 ASSESSMENT — VISUAL ACUITY
OD_CC: 20/20
OS_CC: 20/20

## 2024-08-20 ENCOUNTER — LAB (OUTPATIENT)
Dept: LAB | Facility: LAB | Age: 64
End: 2024-08-20
Payer: MEDICARE

## 2024-08-20 DIAGNOSIS — R79.9 ABNORMAL FINDING OF BLOOD CHEMISTRY, UNSPECIFIED: ICD-10-CM

## 2024-08-20 DIAGNOSIS — E78.5 DYSLIPIDEMIA: ICD-10-CM

## 2024-08-20 DIAGNOSIS — Z00.00 WELCOME TO MEDICARE PREVENTIVE VISIT: ICD-10-CM

## 2024-08-20 DIAGNOSIS — E78.00 HYPERCHOLESTEROLEMIA: ICD-10-CM

## 2024-08-20 LAB
25(OH)D3 SERPL-MCNC: 38 NG/ML (ref 30–100)
ALBUMIN SERPL BCP-MCNC: 4.3 G/DL (ref 3.4–5)
ALP SERPL-CCNC: 87 U/L (ref 33–136)
ALT SERPL W P-5'-P-CCNC: 42 U/L (ref 7–45)
ANION GAP SERPL CALC-SCNC: 16 MMOL/L (ref 10–20)
AST SERPL W P-5'-P-CCNC: 28 U/L (ref 9–39)
BASOPHILS # BLD AUTO: 0.07 X10*3/UL (ref 0–0.1)
BASOPHILS NFR BLD AUTO: 1.1 %
BILIRUB SERPL-MCNC: 0.5 MG/DL (ref 0–1.2)
BUN SERPL-MCNC: 16 MG/DL (ref 6–23)
CALCIUM SERPL-MCNC: 9.6 MG/DL (ref 8.6–10.3)
CHLORIDE SERPL-SCNC: 102 MMOL/L (ref 98–107)
CHOLEST SERPL-MCNC: 171 MG/DL (ref 0–199)
CHOLESTEROL/HDL RATIO: 3.5
CO2 SERPL-SCNC: 26 MMOL/L (ref 21–32)
CREAT SERPL-MCNC: 0.61 MG/DL (ref 0.5–1.05)
EGFRCR SERPLBLD CKD-EPI 2021: >90 ML/MIN/1.73M*2
EOSINOPHIL # BLD AUTO: 0.32 X10*3/UL (ref 0–0.7)
EOSINOPHIL NFR BLD AUTO: 5.2 %
ERYTHROCYTE [DISTWIDTH] IN BLOOD BY AUTOMATED COUNT: 12.5 % (ref 11.5–14.5)
GLUCOSE SERPL-MCNC: 163 MG/DL (ref 74–99)
HCT VFR BLD AUTO: 44.8 % (ref 36–46)
HDLC SERPL-MCNC: 49.2 MG/DL
HGB BLD-MCNC: 14.8 G/DL (ref 12–16)
IMM GRANULOCYTES # BLD AUTO: 0.02 X10*3/UL (ref 0–0.7)
IMM GRANULOCYTES NFR BLD AUTO: 0.3 % (ref 0–0.9)
LDLC SERPL CALC-MCNC: 85 MG/DL
LYMPHOCYTES # BLD AUTO: 1.37 X10*3/UL (ref 1.2–4.8)
LYMPHOCYTES NFR BLD AUTO: 22.1 %
MCH RBC QN AUTO: 30.6 PG (ref 26–34)
MCHC RBC AUTO-ENTMCNC: 33 G/DL (ref 32–36)
MCV RBC AUTO: 93 FL (ref 80–100)
MONOCYTES # BLD AUTO: 0.39 X10*3/UL (ref 0.1–1)
MONOCYTES NFR BLD AUTO: 6.3 %
NEUTROPHILS # BLD AUTO: 4.02 X10*3/UL (ref 1.2–7.7)
NEUTROPHILS NFR BLD AUTO: 65 %
NON HDL CHOLESTEROL: 122 MG/DL (ref 0–149)
NRBC BLD-RTO: 0 /100 WBCS (ref 0–0)
PLATELET # BLD AUTO: 249 X10*3/UL (ref 150–450)
POTASSIUM SERPL-SCNC: 4.1 MMOL/L (ref 3.5–5.3)
PROT SERPL-MCNC: 7.1 G/DL (ref 6.4–8.2)
RBC # BLD AUTO: 4.84 X10*6/UL (ref 4–5.2)
SODIUM SERPL-SCNC: 140 MMOL/L (ref 136–145)
TRIGL SERPL-MCNC: 185 MG/DL (ref 0–149)
TSH SERPL-ACNC: 3.07 MIU/L (ref 0.44–3.98)
VIT B12 SERPL-MCNC: 1067 PG/ML (ref 211–911)
VLDL: 37 MG/DL (ref 0–40)
WBC # BLD AUTO: 6.2 X10*3/UL (ref 4.4–11.3)

## 2024-08-20 PROCEDURE — 36415 COLL VENOUS BLD VENIPUNCTURE: CPT

## 2024-08-20 PROCEDURE — 82607 VITAMIN B-12: CPT

## 2024-08-20 PROCEDURE — 82306 VITAMIN D 25 HYDROXY: CPT

## 2024-08-21 ENCOUNTER — HOSPITAL ENCOUNTER (OUTPATIENT)
Dept: RADIOLOGY | Facility: HOSPITAL | Age: 64
Discharge: HOME | End: 2024-08-21
Payer: MEDICARE

## 2024-08-21 VITALS — BODY MASS INDEX: 36.88 KG/M2 | HEIGHT: 67 IN | WEIGHT: 235 LBS

## 2024-08-21 DIAGNOSIS — E11.9 TYPE 2 DIABETES MELLITUS WITHOUT COMPLICATION, WITHOUT LONG-TERM CURRENT USE OF INSULIN (MULTI): ICD-10-CM

## 2024-08-21 DIAGNOSIS — R92.8 ABNORMAL MAMMOGRAM: Primary | ICD-10-CM

## 2024-08-21 DIAGNOSIS — Z12.31 ENCOUNTER FOR SCREENING MAMMOGRAM FOR MALIGNANT NEOPLASM OF BREAST: ICD-10-CM

## 2024-08-21 DIAGNOSIS — Z12.39 BREAST SCREENING: ICD-10-CM

## 2024-08-21 PROCEDURE — 77067 SCR MAMMO BI INCL CAD: CPT

## 2024-08-22 RX ORDER — METFORMIN HYDROCHLORIDE 1000 MG/1
1000 TABLET ORAL
Qty: 180 TABLET | Refills: 0 | Status: SHIPPED | OUTPATIENT
Start: 2024-08-22 | End: 2024-11-20

## 2024-08-29 DIAGNOSIS — K21.9 GASTRO-ESOPHAGEAL REFLUX DISEASE WITHOUT ESOPHAGITIS: ICD-10-CM

## 2024-08-29 RX ORDER — OMEPRAZOLE 40 MG/1
40 CAPSULE, DELAYED RELEASE ORAL DAILY
Qty: 90 CAPSULE | Refills: 0 | Status: SHIPPED | OUTPATIENT
Start: 2024-08-29

## 2024-09-15 DIAGNOSIS — F32.9 MAJOR DEPRESSIVE DISORDER, SINGLE EPISODE, UNSPECIFIED: ICD-10-CM

## 2024-09-16 ENCOUNTER — HOSPITAL ENCOUNTER (OUTPATIENT)
Dept: RADIOLOGY | Facility: HOSPITAL | Age: 64
Discharge: HOME | End: 2024-09-16
Payer: MEDICARE

## 2024-09-16 DIAGNOSIS — R92.8 OTHER ABNORMAL AND INCONCLUSIVE FINDINGS ON DIAGNOSTIC IMAGING OF BREAST: ICD-10-CM

## 2024-09-16 PROCEDURE — 77061 BREAST TOMOSYNTHESIS UNI: CPT | Mod: RT

## 2024-09-16 PROCEDURE — G0279 TOMOSYNTHESIS, MAMMO: HCPCS | Mod: RIGHT SIDE | Performed by: RADIOLOGY

## 2024-09-16 PROCEDURE — 77065 DX MAMMO INCL CAD UNI: CPT | Mod: RIGHT SIDE | Performed by: RADIOLOGY

## 2024-09-16 RX ORDER — CITALOPRAM 20 MG/1
20 TABLET, FILM COATED ORAL DAILY
Qty: 90 TABLET | Refills: 0 | Status: SHIPPED | OUTPATIENT
Start: 2024-09-16

## 2024-09-19 DIAGNOSIS — J45.30 MILD PERSISTENT ASTHMA WITHOUT COMPLICATION (HHS-HCC): ICD-10-CM

## 2024-09-19 RX ORDER — MONTELUKAST SODIUM 10 MG/1
10 TABLET ORAL DAILY
Qty: 90 TABLET | Refills: 0 | Status: SHIPPED | OUTPATIENT
Start: 2024-09-19

## 2024-11-17 DIAGNOSIS — E11.9 TYPE 2 DIABETES MELLITUS WITHOUT COMPLICATION, WITHOUT LONG-TERM CURRENT USE OF INSULIN (MULTI): ICD-10-CM

## 2024-11-18 DIAGNOSIS — E11.9 TYPE 2 DIABETES MELLITUS WITHOUT COMPLICATION, WITHOUT LONG-TERM CURRENT USE OF INSULIN (MULTI): ICD-10-CM

## 2024-11-18 DIAGNOSIS — K21.9 GASTRO-ESOPHAGEAL REFLUX DISEASE WITHOUT ESOPHAGITIS: ICD-10-CM

## 2024-11-18 RX ORDER — METFORMIN HYDROCHLORIDE 1000 MG/1
1000 TABLET ORAL
Qty: 180 TABLET | Refills: 0 | Status: SHIPPED | OUTPATIENT
Start: 2024-11-18 | End: 2024-11-18 | Stop reason: SDUPTHER

## 2024-11-18 RX ORDER — OMEPRAZOLE 40 MG/1
40 CAPSULE, DELAYED RELEASE ORAL DAILY
Qty: 90 CAPSULE | Refills: 0 | Status: SHIPPED | OUTPATIENT
Start: 2024-11-18

## 2024-11-18 RX ORDER — METFORMIN HYDROCHLORIDE 1000 MG/1
1000 TABLET ORAL
Qty: 180 TABLET | Refills: 0 | Status: SHIPPED | OUTPATIENT
Start: 2024-11-18 | End: 2025-02-16

## 2024-11-23 DIAGNOSIS — K21.9 GASTRO-ESOPHAGEAL REFLUX DISEASE WITHOUT ESOPHAGITIS: ICD-10-CM

## 2024-11-25 RX ORDER — OMEPRAZOLE 40 MG/1
40 CAPSULE, DELAYED RELEASE ORAL DAILY
Qty: 90 CAPSULE | Refills: 0 | Status: SHIPPED | OUTPATIENT
Start: 2024-11-25

## 2024-12-05 DIAGNOSIS — E78.00 HYPERCHOLESTEROLEMIA: ICD-10-CM

## 2024-12-05 DIAGNOSIS — F32.9 MAJOR DEPRESSIVE DISORDER, SINGLE EPISODE, UNSPECIFIED: ICD-10-CM

## 2024-12-09 RX ORDER — ROSUVASTATIN CALCIUM 5 MG/1
5 TABLET, COATED ORAL DAILY
Qty: 90 TABLET | Refills: 0 | Status: SHIPPED | OUTPATIENT
Start: 2024-12-09

## 2024-12-09 RX ORDER — CITALOPRAM 20 MG/1
20 TABLET, FILM COATED ORAL DAILY
Qty: 90 TABLET | Refills: 0 | Status: SHIPPED | OUTPATIENT
Start: 2024-12-09

## 2024-12-15 DIAGNOSIS — J45.30 MILD PERSISTENT ASTHMA WITHOUT COMPLICATION (HHS-HCC): ICD-10-CM

## 2024-12-16 RX ORDER — MONTELUKAST SODIUM 10 MG/1
10 TABLET ORAL DAILY
Qty: 90 TABLET | Refills: 0 | Status: SHIPPED | OUTPATIENT
Start: 2024-12-16

## 2025-02-13 DIAGNOSIS — E11.9 TYPE 2 DIABETES MELLITUS WITHOUT COMPLICATION, WITHOUT LONG-TERM CURRENT USE OF INSULIN (MULTI): ICD-10-CM

## 2025-02-13 DIAGNOSIS — K21.9 GASTRO-ESOPHAGEAL REFLUX DISEASE WITHOUT ESOPHAGITIS: ICD-10-CM

## 2025-02-14 RX ORDER — METFORMIN HYDROCHLORIDE 1000 MG/1
1000 TABLET ORAL
Qty: 180 TABLET | Refills: 0 | Status: SHIPPED | OUTPATIENT
Start: 2025-02-14 | End: 2025-05-15

## 2025-02-14 RX ORDER — OMEPRAZOLE 40 MG/1
40 CAPSULE, DELAYED RELEASE ORAL DAILY
Qty: 90 CAPSULE | Refills: 0 | Status: SHIPPED | OUTPATIENT
Start: 2025-02-14

## 2025-03-06 DIAGNOSIS — E78.00 HYPERCHOLESTEROLEMIA: ICD-10-CM

## 2025-03-06 DIAGNOSIS — R05.1 ACUTE COUGH: ICD-10-CM

## 2025-03-06 RX ORDER — BENZONATATE 200 MG/1
200 CAPSULE ORAL 3 TIMES DAILY PRN
COMMUNITY
End: 2025-03-06 | Stop reason: SDUPTHER

## 2025-03-07 DIAGNOSIS — F32.9 MAJOR DEPRESSIVE DISORDER, SINGLE EPISODE, UNSPECIFIED: ICD-10-CM

## 2025-03-07 DIAGNOSIS — E78.00 HYPERCHOLESTEROLEMIA: ICD-10-CM

## 2025-03-07 RX ORDER — ROSUVASTATIN CALCIUM 5 MG/1
5 TABLET, COATED ORAL DAILY
Qty: 90 TABLET | Refills: 0 | Status: SHIPPED | OUTPATIENT
Start: 2025-03-07

## 2025-03-07 RX ORDER — AZITHROMYCIN 500 MG/1
500 TABLET, FILM COATED ORAL DAILY
Qty: 5 TABLET | Refills: 0 | Status: SHIPPED | OUTPATIENT
Start: 2025-03-07 | End: 2025-03-12

## 2025-03-07 RX ORDER — CITALOPRAM 20 MG/1
20 TABLET, FILM COATED ORAL DAILY
Qty: 90 TABLET | Refills: 0 | Status: SHIPPED | OUTPATIENT
Start: 2025-03-07

## 2025-03-07 RX ORDER — BENZONATATE 200 MG/1
200 CAPSULE ORAL 3 TIMES DAILY PRN
Qty: 20 CAPSULE | Refills: 0 | Status: SHIPPED | OUTPATIENT
Start: 2025-03-07

## 2025-03-12 DIAGNOSIS — R05.1 ACUTE COUGH: ICD-10-CM

## 2025-03-12 RX ORDER — BENZONATATE 200 MG/1
200 CAPSULE ORAL 3 TIMES DAILY PRN
Qty: 20 CAPSULE | Refills: 0 | Status: SHIPPED | OUTPATIENT
Start: 2025-03-12

## 2025-03-19 DIAGNOSIS — F32.9 MAJOR DEPRESSIVE DISORDER, SINGLE EPISODE, UNSPECIFIED: ICD-10-CM

## 2025-03-19 RX ORDER — CITALOPRAM 20 MG/1
20 TABLET, FILM COATED ORAL DAILY
Qty: 90 TABLET | Refills: 0 | Status: SHIPPED | OUTPATIENT
Start: 2025-03-19

## 2025-05-09 DIAGNOSIS — R06.00 DYSPNEA, UNSPECIFIED TYPE: ICD-10-CM

## 2025-05-09 DIAGNOSIS — K21.9 GASTRO-ESOPHAGEAL REFLUX DISEASE WITHOUT ESOPHAGITIS: ICD-10-CM

## 2025-05-09 DIAGNOSIS — E11.9 TYPE 2 DIABETES MELLITUS WITHOUT COMPLICATION, WITHOUT LONG-TERM CURRENT USE OF INSULIN: ICD-10-CM

## 2025-05-09 RX ORDER — BUDESONIDE AND FORMOTEROL FUMARATE DIHYDRATE 160; 4.5 UG/1; UG/1
2 AEROSOL RESPIRATORY (INHALATION) 2 TIMES DAILY
Qty: 18 G | Refills: 1 | Status: SHIPPED | OUTPATIENT
Start: 2025-05-09

## 2025-05-09 RX ORDER — OMEPRAZOLE 40 MG/1
40 CAPSULE, DELAYED RELEASE ORAL DAILY
Qty: 90 CAPSULE | Refills: 1 | Status: SHIPPED | OUTPATIENT
Start: 2025-05-09

## 2025-05-09 RX ORDER — METFORMIN HYDROCHLORIDE 1000 MG/1
1000 TABLET ORAL
Qty: 180 TABLET | Refills: 1 | Status: SHIPPED | OUTPATIENT
Start: 2025-05-09

## 2025-05-25 ENCOUNTER — ANCILLARY PROCEDURE (OUTPATIENT)
Dept: URGENT CARE | Age: 65
End: 2025-05-25
Payer: COMMERCIAL

## 2025-05-25 ENCOUNTER — CLINICAL SUPPORT (OUTPATIENT)
Dept: URGENT CARE | Age: 65
End: 2025-05-25
Payer: COMMERCIAL

## 2025-05-25 VITALS
BODY MASS INDEX: 36.33 KG/M2 | SYSTOLIC BLOOD PRESSURE: 141 MMHG | WEIGHT: 231.48 LBS | DIASTOLIC BLOOD PRESSURE: 79 MMHG | TEMPERATURE: 98.1 F | HEART RATE: 97 BPM | OXYGEN SATURATION: 95 % | HEIGHT: 67 IN | RESPIRATION RATE: 20 BRPM

## 2025-05-25 DIAGNOSIS — S80.02XA CONTUSION OF LEFT KNEE, INITIAL ENCOUNTER: ICD-10-CM

## 2025-05-25 DIAGNOSIS — S20.212A RIB CONTUSION, LEFT, INITIAL ENCOUNTER: Primary | ICD-10-CM

## 2025-05-25 DIAGNOSIS — R52 PAIN: ICD-10-CM

## 2025-05-25 PROCEDURE — 3008F BODY MASS INDEX DOCD: CPT

## 2025-05-25 PROCEDURE — 1125F AMNT PAIN NOTED PAIN PRSNT: CPT

## 2025-05-25 PROCEDURE — 71101 X-RAY EXAM UNILAT RIBS/CHEST: CPT | Mod: LEFT SIDE

## 2025-05-25 PROCEDURE — 3078F DIAST BP <80 MM HG: CPT

## 2025-05-25 PROCEDURE — 1159F MED LIST DOCD IN RCRD: CPT

## 2025-05-25 PROCEDURE — 3077F SYST BP >= 140 MM HG: CPT

## 2025-05-25 PROCEDURE — 1160F RVW MEDS BY RX/DR IN RCRD: CPT

## 2025-05-25 PROCEDURE — 99213 OFFICE O/P EST LOW 20 MIN: CPT

## 2025-05-25 PROCEDURE — 73564 X-RAY EXAM KNEE 4 OR MORE: CPT | Mod: LEFT SIDE

## 2025-05-25 ASSESSMENT — PATIENT HEALTH QUESTIONNAIRE - PHQ9
SUM OF ALL RESPONSES TO PHQ9 QUESTIONS 1 & 2: 0
2. FEELING DOWN, DEPRESSED OR HOPELESS: NOT AT ALL
1. LITTLE INTEREST OR PLEASURE IN DOING THINGS: NOT AT ALL

## 2025-05-25 ASSESSMENT — PAIN SCALES - GENERAL: PAINLEVEL_OUTOF10: 8

## 2025-05-25 NOTE — PATIENT INSTRUCTIONS
Discharge instructions:    Contusion left ribs as well as contusion left knee.  Recommending over-the-counter pain control can use over-the-counter lidocaine patches as well.    Cannot lift more than 10 pounds during restrictive period.    Must return to clinic on 5/30/2025 for reevaluation.    It is important to take prescriptions as prescribed and complete all antibiotics.     If severe pain, numbness, tingling, weakness, chest pain, shortness of breath, abdominal pain, vomiting, headache or dizziness go to emergency room.    If new injury regarding this current claim please return to clinic.    If your symptoms worsen you are instructed to immediately go to the emergency room for reevaluation and further assessment.    If you develop any chest pain, SOB, or difficulty breathing you are instructed to go to the emergency room for reevaluation.    All discharge instructions will be provided and explained to the patient at discharge.    If you have any questions regarding your treatment plan please call the [facility] urgent care clinic.

## 2025-05-25 NOTE — PROGRESS NOTES
Patient is a 64-year-old female presenting to the clinic with complaints of a fall at work.  Patient works at [employer].  Patient states while walking on the concrete she tripped over an object.  Patient states she fell on her left side injuring the lateral aspect of her left knee as well as her left side rib.  It is lower anterior palpable rib point tender.  Patient denies any chest pain shortness of breath or difficulty breathing.  Patient denies any ankle pain hip pain chest pain or shortness of breath.  Anterior rib pain to palpation coughing and with deep breathing.      History provided by:  Patient  Injury      Physical Exam      _____________MEDCO-14 Physician's Report of Work Ability [facility]-[id number]_________________      Injured Worker:  Date of injury: Claim number:   Angela CARRILLO Prior *** ***     Date of last appointment /examination: Date of this appointment /examination:  Date of next appointment /examination:    ***   05/25/25  ***     Employer name: Injured worker's position of employment at time of injury:   ***    ***     MEDCO-14 submission   1.  Please select one of the following options: {MEDCO-14 SUBMISSION SEC 1:6856615960}         Employment/Occupation (Complete this section and proceed to section 3)  2. Updates made to Employment/Occupation Section: {MEDCO-14 SEC UPDATES; YES/NO:2735960677}    Have you reviewed the description of the injured worker's job held on the date of the injury (former position of employment)? {MEDCO-14 SEC 1 RESPONSE; YES/NO:4786416548}       Work Status/Injured worker's capabilities.   3a. Updates made to work status/injured worker's capabilities: {MEDCO-14 SEC UPDATES; YES/NO:8558920591}    Does the injured worker have any physical or health restrictions related to allowed conditions in the claim? {MEDCO-14 SEC 2 RESPONSE; RESTRICTIONS YES/NO:4575394235}    3b. If restrictions, can the injured worker return to full duties of his/her job held on the date of injury (former  "position of employment)?     {MEDCO-14 SEC 3B RESPONSE; STATUS YES/NO:0315088003}   3c. Please indicate which of the activities listed below the injured worker can perform (even if the response to 3B is No.)     The injured worker is not released to the former position of employment but may return to available and appropriate work with restrictions, the possible return to work date: ***.     The injured worker can perform simple grasping with: {MEDCO-14 SEC 3C RESPONSE; ACTIVITY HAND:7415478015}  The injured worker can perform repetitive wrist motion with: {MEDCO-14 SEC 3C RESPONSE; ACTIVITY HAND:0088445704}  The injured worker's dominant hand is: {MEDCO-14 SEC 3C RESPONSE; DOMIN HAND:2174651155}  The injured worker can perform repetitive actions to operate foot controls or motor vehicles with: {MEDCO-14 SEC 3C RESPONSE; ACTIVITY FEET:4099675116}    If the injured worker is taking prescribed medications for the allowed conditions in this claim, can the injured worker safely:   *Operate heavy machinery: {MEDCO-14 SEC 3C RESPONSE; MISC YES/NO:3246962805}  *Drive: {MEDCO-14 SEC 3C RESPONSE; MISC YES/NO:0720253816}  *Perform other critical job tasks as defined by any source listed above in section 2: {MEDCO-14 SEC 3C RESPONSE; MISC YES/NO:7927186477}     Please indicate the following: Never, Occasionally, Frequently, Continuously    Activity   Bend: {M14 ASMT:9105288812::\"Never\"}  Squat / kneel: {M14 ASMT:2267405710::\"Never\"}  Twist / turn: {M14 ASMT:9944234419::\"Never\"}  Climb: {M14 ASMT:0635146144::\"Never\"} Reach above shoulder: {M14 ASMT:4024110819::\"Never\"}  Type / Keyboard: {M14 ASMT:8772473325::\"Never\"}  Work w/cold substances: {M14 ASMT:8990628685::\"Never\"}  Work w/hot substances: {M14 ASMT:4967894271::\"Never\"}      Lifting/Carrying                                      Pushing/pulling  0 - 10 lbs: {M14 ASMT:9280838732::\"Never\"}  11 - 20 lbs: {M14 ASMT:1472768095::\"Never\"}  21 - 40 lbs: {M14 " "ASMT:1087221474::\"Never\"}  41 - 60 lbs: {M14 ASMT:1661819496::\"Never\"}  61 - 100 lbs: {M14 ASMT:3565770150::\"Never\"} 0 - 25 lbs: {M14 ASMT:8012524603::\"Never\"}  26 - 40 lbs: {M14 ASMT:6554801374::\"Never\"}  41 -  60 lbs: {M14 ASMT:5142899078::\"Never\"}  61 - 100 lbs: {M14 ASMT:2447395989::\"Never\"}  100 + lbs: {M14 ASMT:0555844767::\"Never\"}       How many total hours can the injured worker work?  {MEDCO-14 RESPONSE; DAYS:0017444868}{MEDCO-14  RESPONSE; HRS:7268520196}     In an eight-hour workday, how many total hours is the injured worker potentially able to work?  ***    Sit: {MEDCO-14  RESPONSE; SIT/STAND/WALK HRS:1604048127} {MEDCO-14  RESPONSE; CONT/BREAK:7177099970}  Walk: {MEDCO-14  RESPONSE; SIT/STAND/WALK HRS:5092928441}{MEDCO-14  RESPONSE; CONT/BREAK:6352466708}  Stand: {MEDCO-14  RESPONSE; SIT/STAND/WALK HRS:1636635369}{MEDCO-14  RESPONSE; CONT/BREAK:6531049998}          Does the injured worker have any functional restrictions based only on allowed psychological conditions? {MEDCO-14 RESPONSE;FUNCTNL RSTRCTS:2676522982}    *Note: If Yes is indicated please reference the MEDCO-16 as needed.     Additionally, in this space please provide any additional information addressing the  injured worker's capabilities and/or job accommodations which may not be addressed above. {MEDCO-14 RESPONSE; MISC N/A:0318047478}       Disability information   4a.  *Note: If 3B above is \"No\" or dates updated - all 4A fields, including site/location if applicable must be completed    Updates: {MEDCO-14 SEC UPDATES; YES/NO:5200068630}    Complete the chart below and furnish the narrative description of the diagnosis(es), site/location, if applicable, and ICD code for the conditions being treated due to the work- related injury/disease.  Please indicate if the condition is preventing the injured worker from returning to job duties he/she held on the date of injury.       Narrative description of the work-related allowed condition " Site/Location if applicable ICD Code Is the condition preventing full duty release to the job injured worker held on date of injury?       {MEDCO-14 SEC UPDATES; YES/NO:6476817210}      {MEDCO-14 SEC UPDATES; YES/NO:9575599230}      {MEDCO-14 SEC UPDATES; YES/NO:9169194602}      {MEDCO-14 SEC UPDATES; YES/NO:4038062551}      {MEDCO-14 SEC UPDATES; YES/NO:0624050222}      {MEDCO-14 SEC UPDATES; YES/NO:4628068017}        4B. List all other relevant conditions that impact treatment of the conditions listed above (e.g., co-morbidities or not yet allowed conditions).   {MEDCO-14 RESPONSE; MISC N/A:1700140868}     Clinical findings:    5. You can reference office notes in lieu of writing clinical findings below.  Updates: {MEDCO-14 SEC UPDATES; YES/NO:8219850813}    The injured worker is progressing: {MEDCO-14 RESPONSE;PROGRESS:0710733744}    Provide your clinical and objective findings supporting your medical opinion outlined on this form.  List barriers to return to work and reason, for the injured worker's delay in recovery.   {MEDCO-14 RESPONSE; MISC N/A:5538336912}     Maximum medical improvement (MMI):  6. Updates: {MEDCO-14 SEC UPDATES; YES/NO:3159033737}    MMI is a treatment plateau (static or well-stabilized) at which no fundamental       functional or physiological change can be expected within reasonable medical       probability, in spite of continuing medical or rehabilitative procedures.     Has the work-related injury(s) or occupational disease reached MMI based on the definition above? {MEDCO-14 RESPONSE;MMI YES/NO:1705643797}    *Note: An injured worker may need supportive treatment to maintain his or her level of function after reaching MMI. Thus, periodic medical treatment may still be requested and provided.      Vocational rehabilitation:   7. Updates: {MEDCO-14 SEC UPDATES; YES/NO:8450869060}    Vocational rehabilitation is an individualized and voluntary program for an eligible injured worker who  needs assistance in safely returning to work or in retaining employment.  This program can be tailored around an injured worker's restrictions and may provide job seeking skills or necessary retraining. Is the injured worker a candidate for vocational rehabilitation services focusing on return to work? {MEDCO-14 RESPONSE;VOC REHB YES/NO:2143473564}     Treating physician signature - mandatory:  8. I certify the information on this form is correct to the best of my knowledge. I am aware that any person who knowingly makes a false statement, misrepresentation, concealment of fact or any other act of fraud to obtain payment as provided by [facility], or who knowingly accepts payment to which that person is not entitled, is subject to felony criminal prosecution and may be punished, under appropriate criminal provisions. by a fine or imprisonment or both.     Treating physician's name (please print legibly): [facility]  [facility] provider (Elena) number: ***   Complete Address, Telephone, Fax number and Date:   ***   Treating physician's signature: [facility]       Patient is a 64-year-old female presenting to the clinic with complaints of a fall at work.  Patient works at [employer].  Patient states while walking on the concrete she tripped over an object.  Patient states she fell on her left side injuring the lateral aspect of her left knee as well as her left side rib.  It is lower anterior palpable rib point tender.  Patient denies any chest pain shortness of breath or difficulty breathing.  Patient denies any ankle pain hip pain chest pain or shortness of breath.  Anterior rib pain to palpation coughing and with deep breathing.  Vital signs in the clinic are stable.  Physical examination as above.  Pending x-ray left chest ribs as well as left knee.    Patient disposition: { Disposition:18029}

## 2025-05-26 ENCOUNTER — TELEPHONE (OUTPATIENT)
Dept: URGENT CARE | Age: 65
End: 2025-05-26

## 2025-05-30 ENCOUNTER — OFFICE VISIT (OUTPATIENT)
Dept: URGENT CARE | Age: 65
End: 2025-05-30
Payer: COMMERCIAL

## 2025-05-30 VITALS
RESPIRATION RATE: 16 BRPM | BODY MASS INDEX: 36.57 KG/M2 | DIASTOLIC BLOOD PRESSURE: 91 MMHG | HEIGHT: 67 IN | HEART RATE: 86 BPM | OXYGEN SATURATION: 95 % | SYSTOLIC BLOOD PRESSURE: 158 MMHG | WEIGHT: 233.03 LBS | TEMPERATURE: 98.2 F

## 2025-05-30 DIAGNOSIS — S20.212D CONTUSION OF LEFT CHEST WALL, SUBSEQUENT ENCOUNTER: Primary | ICD-10-CM

## 2025-05-30 DIAGNOSIS — S80.02XD CONTUSION OF LEFT KNEE, SUBSEQUENT ENCOUNTER: ICD-10-CM

## 2025-05-30 DIAGNOSIS — R03.0 ELEVATED BLOOD PRESSURE READING: ICD-10-CM

## 2025-05-30 NOTE — PROGRESS NOTES
"Subjective   Patient ID: Angela Boyce is a 64 y.o. female. They present today with a chief complaint of Follow-up (Workers comp, rib pain).    History of Present Illness  See mdm       History provided by:  Patient   used: No        Past Medical History  Allergies as of 05/30/2025    (No Known Allergies)       Prescriptions Prior to Admission[1]     Medical History[2]    Surgical History[3]     reports that she has never smoked. She has never used smokeless tobacco. She reports that she does not currently use alcohol. She reports that she does not use drugs.    Review of Systems  Review of Systems   All other systems reviewed and are negative.                                 Objective    Vitals:    05/30/25 0912   BP: (!) 158/91   Pulse: 86   Resp: 16   Temp: 36.8 °C (98.2 °F)   SpO2: 95%   Weight: 106 kg (233 lb 0.4 oz)   Height: 1.702 m (5' 7\")     No LMP recorded (lmp unknown). Patient is postmenopausal.    Physical Exam  Vitals and nursing note reviewed.   Constitutional:       General: She is not in acute distress.     Appearance: Normal appearance. She is normal weight. She is not ill-appearing, toxic-appearing or diaphoretic.   HENT:      Head: Normocephalic and atraumatic.      Mouth/Throat:      Mouth: Mucous membranes are moist.   Eyes:      General: No scleral icterus.        Right eye: No discharge.         Left eye: No discharge.      Extraocular Movements: Extraocular movements intact.      Conjunctiva/sclera: Conjunctivae normal.      Pupils: Pupils are equal, round, and reactive to light.   Cardiovascular:      Rate and Rhythm: Normal rate and regular rhythm.      Pulses: Normal pulses.      Heart sounds: Normal heart sounds. No murmur heard.     No friction rub. No gallop.   Pulmonary:      Effort: Pulmonary effort is normal. No respiratory distress.      Breath sounds: No wheezing, rhonchi or rales.      Comments: Gestures left anterior, inferior rib pain although there is no " point tenderness.  No ecchymosis over the painful area.  There is small bruise to the left breast 2cm.  No flail chest   Chest:      Chest wall: No tenderness.   Abdominal:      General: Abdomen is flat.      Tenderness: There is no abdominal tenderness. There is no guarding or rebound.      Comments: No abdominal tenderness   Musculoskeletal:         General: Normal range of motion.      Cervical back: Normal range of motion and neck supple. No rigidity or tenderness.      Comments: ROM of the left knee is normal.  No tenderness.  Ambulatory. NVI.     Lymphadenopathy:      Cervical: No cervical adenopathy.   Skin:     General: Skin is warm and dry.      Capillary Refill: Capillary refill takes less than 2 seconds.      Coloration: Skin is not jaundiced or pale.      Findings: No rash.   Neurological:      General: No focal deficit present.      Mental Status: She is alert.      Gait: Gait normal.   Psychiatric:         Mood and Affect: Mood normal.         Behavior: Behavior normal.         Procedures    Point of Care Test & Imaging Results from this visit  No results found for this visit on 05/30/25.   Imaging  No results found.    Cardiology, Vascular, and Other Imaging  No other imaging results found for the past 2 days      Diagnostic study results (if any) were reviewed by Amanda Pena PA-C.    Assessment/Plan   Allergies, medications, history, and pertinent labs/EKGs/Imaging reviewed by Amanda Pena PA-C.     Medical Decision Making  64-year-old female presents for Worker's Comp. injury follow-up.  On 5/25/2025 patient fell while working at [employer] injuring her left knee and left side of chest.  She had negative chest and knee x-rays.  Patient was written off from 5/25-5/30.  She actually went to work during that time though.  She states knee pain has completely resolved.  She does have some continued chest wall pain although states it is much better.  She is using lidocaine patches and Advil  occasionally for her symptoms.  Physical exam largely benign.  Range of motion of knee is good and she is weightbearing.  Gestures to anterior inferior left rib pain although there is no focal point tenderness.  Chest clear to auscultation.  Discussed she can take acetaminophen in addition to Advil and lidocaine patches.  Discussed breathing exercises for prevention of pneumonia.  She is [employer]  and does not lift objects or perform other physically difficult activities.  Cleared to return to full duty.  Encouraged follow-up with primary care provider, recheck elevated BP.  Discussed expected course, indications for return or for presentation to emergency department.  Discharged good condition agreeable to plan as discussed.  Case discussed with attending physician per facility protocol.      Orders and Diagnoses  Diagnoses and all orders for this visit:  Contusion of left chest wall, subsequent encounter  Elevated blood pressure reading  Contusion of left knee, subsequent encounter      Medical Admin Record      Patient disposition: Home    Electronically signed by Amanda Pena PA-C  9:44 AM           [1] (Not in a hospital admission)   [2]   Past Medical History:  Diagnosis Date    Abscess of axilla 08/07/2023    Allergic bronchitis ([facility]) 08/07/2023    Allergic rhinitis 08/07/2023    Bleeding per rectum 08/07/2023    Cellulitis of knee, left 08/07/2023    Chest tightness 08/07/2023    Dizziness 08/07/2023    Osteoarthritis of knee, unspecified     DJD (degenerative joint disease) of knee    Personal history of other diseases of the digestive system     History of gastroesophageal reflux (GERD)    Personal history of other diseases of the respiratory system 06/15/2018    History of allergic rhinitis    Personal history of other specified conditions     History of insomnia    Pure hypercholesterolemia, unspecified 11/02/2021    Hypercholesterolemia   [3]   Past Surgical History:  Procedure Laterality  Date    KNEE ARTHROSCOPY W/ DEBRIDEMENT  04/24/2014    Arthroscopy Knee    OTHER SURGICAL HISTORY  04/24/2014    Leg Repair

## 2025-05-30 NOTE — PATIENT INSTRUCTIONS
Thank you for visiting  urgent care.  Your blood pressure was elevated today.  Follow-up with your primary care provider in the next 5 days for recheck of symptoms as well as recheck of blood pressure.  Go to emergency department for any new or worsening symptoms.

## 2025-06-04 DIAGNOSIS — F32.9 MAJOR DEPRESSIVE DISORDER, SINGLE EPISODE, UNSPECIFIED: ICD-10-CM

## 2025-06-04 DIAGNOSIS — E78.00 HYPERCHOLESTEROLEMIA: ICD-10-CM

## 2025-06-04 RX ORDER — CITALOPRAM 20 MG/1
20 TABLET ORAL DAILY
Qty: 90 TABLET | Refills: 0 | Status: SHIPPED | OUTPATIENT
Start: 2025-06-04

## 2025-06-04 RX ORDER — ROSUVASTATIN CALCIUM 5 MG/1
5 TABLET, COATED ORAL DAILY
Qty: 90 TABLET | Refills: 0 | Status: SHIPPED | OUTPATIENT
Start: 2025-06-04

## 2025-07-03 DIAGNOSIS — R06.00 DYSPNEA, UNSPECIFIED TYPE: ICD-10-CM

## 2025-07-03 RX ORDER — BUDESONIDE AND FORMOTEROL FUMARATE DIHYDRATE 160; 4.5 UG/1; UG/1
2 AEROSOL RESPIRATORY (INHALATION) 2 TIMES DAILY
Qty: 10.2 G | Refills: 1 | Status: SHIPPED | OUTPATIENT
Start: 2025-07-03

## 2025-08-05 DIAGNOSIS — K21.9 GASTRO-ESOPHAGEAL REFLUX DISEASE WITHOUT ESOPHAGITIS: ICD-10-CM

## 2025-08-05 DIAGNOSIS — E11.9 TYPE 2 DIABETES MELLITUS WITHOUT COMPLICATION, WITHOUT LONG-TERM CURRENT USE OF INSULIN: ICD-10-CM

## 2025-08-05 DIAGNOSIS — E78.00 HYPERCHOLESTEROLEMIA: ICD-10-CM

## 2025-08-05 RX ORDER — ROSUVASTATIN CALCIUM 5 MG/1
5 TABLET, COATED ORAL DAILY
Qty: 45 TABLET | Refills: 0 | Status: SHIPPED | OUTPATIENT
Start: 2025-08-05

## 2025-08-05 RX ORDER — OMEPRAZOLE 40 MG/1
40 CAPSULE, DELAYED RELEASE ORAL DAILY
Qty: 45 CAPSULE | Refills: 0 | Status: SHIPPED | OUTPATIENT
Start: 2025-08-05

## 2025-08-05 RX ORDER — METFORMIN HYDROCHLORIDE 1000 MG/1
1000 TABLET ORAL
Qty: 90 TABLET | Refills: 0 | Status: SHIPPED | OUTPATIENT
Start: 2025-08-05

## 2025-08-12 PROBLEM — Z00.00 HEALTHCARE MAINTENANCE: Status: ACTIVE | Noted: 2025-08-12

## 2025-08-12 PROBLEM — Z86.39 HX OF THYROID NODULE: Status: ACTIVE | Noted: 2025-08-12

## 2025-08-12 PROBLEM — M85.852 OSTEOPENIA OF NECK OF LEFT FEMUR: Status: ACTIVE | Noted: 2025-08-12

## 2025-08-12 PROBLEM — Z12.31 SCREENING MAMMOGRAM FOR BREAST CANCER: Status: ACTIVE | Noted: 2025-08-12

## 2025-08-13 ENCOUNTER — APPOINTMENT (OUTPATIENT)
Dept: PRIMARY CARE | Facility: CLINIC | Age: 65
End: 2025-08-13
Payer: MEDICARE

## 2025-08-13 VITALS
HEIGHT: 67 IN | DIASTOLIC BLOOD PRESSURE: 80 MMHG | BODY MASS INDEX: 35.94 KG/M2 | SYSTOLIC BLOOD PRESSURE: 132 MMHG | WEIGHT: 229 LBS | RESPIRATION RATE: 18 BRPM | HEART RATE: 84 BPM | OXYGEN SATURATION: 94 %

## 2025-08-13 DIAGNOSIS — K59.00 CONSTIPATION, UNSPECIFIED CONSTIPATION TYPE: ICD-10-CM

## 2025-08-13 DIAGNOSIS — E11.9 TYPE 2 DIABETES MELLITUS WITHOUT COMPLICATION, WITHOUT LONG-TERM CURRENT USE OF INSULIN: ICD-10-CM

## 2025-08-13 DIAGNOSIS — M85.852 OSTEOPENIA OF NECK OF LEFT FEMUR: ICD-10-CM

## 2025-08-13 DIAGNOSIS — E78.5 DYSLIPIDEMIA: ICD-10-CM

## 2025-08-13 DIAGNOSIS — Z00.00 HEALTHCARE MAINTENANCE: Primary | ICD-10-CM

## 2025-08-13 DIAGNOSIS — J45.20 MILD INTERMITTENT ASTHMA WITHOUT COMPLICATION (HHS-HCC): ICD-10-CM

## 2025-08-13 DIAGNOSIS — Z86.39 HX OF THYROID NODULE: ICD-10-CM

## 2025-08-13 DIAGNOSIS — F32.A DEPRESSION, UNSPECIFIED DEPRESSION TYPE: ICD-10-CM

## 2025-08-13 DIAGNOSIS — Z12.31 SCREENING MAMMOGRAM FOR BREAST CANCER: ICD-10-CM

## 2025-08-13 LAB — POC HEMOGLOBIN A1C: 6.1 % (ref 4.2–6.5)

## 2025-08-13 PROCEDURE — 3008F BODY MASS INDEX DOCD: CPT

## 2025-08-13 PROCEDURE — 1160F RVW MEDS BY RX/DR IN RCRD: CPT

## 2025-08-13 PROCEDURE — 1170F FXNL STATUS ASSESSED: CPT

## 2025-08-13 PROCEDURE — 99214 OFFICE O/P EST MOD 30 MIN: CPT

## 2025-08-13 PROCEDURE — 3075F SYST BP GE 130 - 139MM HG: CPT

## 2025-08-13 PROCEDURE — 83036 HEMOGLOBIN GLYCOSYLATED A1C: CPT

## 2025-08-13 PROCEDURE — 1036F TOBACCO NON-USER: CPT

## 2025-08-13 PROCEDURE — 1125F AMNT PAIN NOTED PAIN PRSNT: CPT

## 2025-08-13 PROCEDURE — 1159F MED LIST DOCD IN RCRD: CPT

## 2025-08-13 PROCEDURE — 3044F HG A1C LEVEL LT 7.0%: CPT

## 2025-08-13 PROCEDURE — G0439 PPPS, SUBSEQ VISIT: HCPCS

## 2025-08-13 PROCEDURE — 3079F DIAST BP 80-89 MM HG: CPT

## 2025-08-13 RX ORDER — FEXOFENADINE HCL 180 MG/1
180 TABLET ORAL DAILY PRN
COMMUNITY

## 2025-08-13 ASSESSMENT — ENCOUNTER SYMPTOMS
DIFFICULTY URINATING: 0
COUGH: 0
CONSTIPATION: 1
HEMATURIA: 0
ABDOMINAL PAIN: 1
DIZZINESS: 0
CHILLS: 0
ABDOMINAL DISTENTION: 1
BLOOD IN STOOL: 0
APPETITE CHANGE: 0
JOINT SWELLING: 0
WHEEZING: 1
VOMITING: 0
HEADACHES: 1
FLANK PAIN: 0
SHORTNESS OF BREATH: 0
LIGHT-HEADEDNESS: 0
FREQUENCY: 0
NAUSEA: 0
DYSURIA: 0
FEVER: 0
ACTIVITY CHANGE: 0
DIARRHEA: 0

## 2025-08-13 ASSESSMENT — ACTIVITIES OF DAILY LIVING (ADL)
DOING_HOUSEWORK: INDEPENDENT
DRESSING: INDEPENDENT
BATHING: INDEPENDENT
TAKING_MEDICATION: INDEPENDENT
MANAGING_FINANCES: INDEPENDENT
GROCERY_SHOPPING: INDEPENDENT

## 2025-08-13 ASSESSMENT — PAIN SCALES - GENERAL: PAINLEVEL_OUTOF10: 8

## 2025-08-13 ASSESSMENT — PATIENT HEALTH QUESTIONNAIRE - PHQ9
2. FEELING DOWN, DEPRESSED OR HOPELESS: NOT AT ALL
1. LITTLE INTEREST OR PLEASURE IN DOING THINGS: NOT AT ALL
SUM OF ALL RESPONSES TO PHQ9 QUESTIONS 1 AND 2: 0

## 2025-08-14 LAB
ALBUMIN/CREAT UR: 3 MG/G CREAT
CREAT UR-MCNC: 70 MG/DL (ref 20–275)
MICROALBUMIN UR-MCNC: 0.2 MG/DL

## 2025-08-21 LAB
25(OH)D3+25(OH)D2 SERPL-MCNC: 32 NG/ML (ref 30–100)
ALBUMIN SERPL-MCNC: 4.5 G/DL (ref 3.6–5.1)
ALP SERPL-CCNC: 74 U/L (ref 37–153)
ALT SERPL-CCNC: 39 U/L (ref 6–29)
ANION GAP SERPL CALCULATED.4IONS-SCNC: 10 MMOL/L (CALC) (ref 7–17)
AST SERPL-CCNC: 33 U/L (ref 10–35)
BASOPHILS # BLD AUTO: 78 CELLS/UL (ref 0–200)
BASOPHILS NFR BLD AUTO: 1.1 %
BILIRUB SERPL-MCNC: 0.5 MG/DL (ref 0.2–1.2)
BUN SERPL-MCNC: 13 MG/DL (ref 7–25)
CALCIUM SERPL-MCNC: 9.7 MG/DL (ref 8.6–10.4)
CHLORIDE SERPL-SCNC: 100 MMOL/L (ref 98–110)
CHOLEST SERPL-MCNC: 206 MG/DL
CHOLEST/HDLC SERPL: 3.1 (CALC)
CO2 SERPL-SCNC: 28 MMOL/L (ref 20–32)
CREAT SERPL-MCNC: 0.55 MG/DL (ref 0.5–1.05)
EGFRCR SERPLBLD CKD-EPI 2021: 101 ML/MIN/1.73M2
EOSINOPHIL # BLD AUTO: 249 CELLS/UL (ref 15–500)
EOSINOPHIL NFR BLD AUTO: 3.5 %
ERYTHROCYTE [DISTWIDTH] IN BLOOD BY AUTOMATED COUNT: 12.6 % (ref 11–15)
GLUCOSE SERPL-MCNC: 133 MG/DL (ref 65–99)
HCT VFR BLD AUTO: 45.1 % (ref 35–45)
HDLC SERPL-MCNC: 66 MG/DL
HGB BLD-MCNC: 15.1 G/DL (ref 11.7–15.5)
LDLC SERPL CALC-MCNC: 111 MG/DL (CALC)
LYMPHOCYTES # BLD AUTO: 1541 CELLS/UL (ref 850–3900)
LYMPHOCYTES NFR BLD AUTO: 21.7 %
MCH RBC QN AUTO: 30.9 PG (ref 27–33)
MCHC RBC AUTO-ENTMCNC: 33.5 G/DL (ref 32–36)
MCV RBC AUTO: 92.4 FL (ref 80–100)
MONOCYTES # BLD AUTO: 433 CELLS/UL (ref 200–950)
MONOCYTES NFR BLD AUTO: 6.1 %
NEUTROPHILS # BLD AUTO: 4800 CELLS/UL (ref 1500–7800)
NEUTROPHILS NFR BLD AUTO: 67.6 %
NONHDLC SERPL-MCNC: 140 MG/DL (CALC)
PLATELET # BLD AUTO: 278 THOUSAND/UL (ref 140–400)
PMV BLD REES-ECKER: 10.4 FL (ref 7.5–12.5)
POTASSIUM SERPL-SCNC: 4.7 MMOL/L (ref 3.5–5.3)
PROT SERPL-MCNC: 7 G/DL (ref 6.1–8.1)
RBC # BLD AUTO: 4.88 MILLION/UL (ref 3.8–5.1)
SODIUM SERPL-SCNC: 138 MMOL/L (ref 135–146)
TRIGL SERPL-MCNC: 170 MG/DL
TSH SERPL-ACNC: 3.87 MIU/L (ref 0.4–4.5)
WBC # BLD AUTO: 7.1 THOUSAND/UL (ref 3.8–10.8)

## 2025-08-22 DIAGNOSIS — E78.00 HYPERCHOLESTEROLEMIA: ICD-10-CM

## 2025-08-22 RX ORDER — ROSUVASTATIN CALCIUM 10 MG/1
10 TABLET, COATED ORAL DAILY
Qty: 90 TABLET | Refills: 2 | Status: SHIPPED | OUTPATIENT
Start: 2025-08-22

## 2025-08-26 ENCOUNTER — HOSPITAL ENCOUNTER (OUTPATIENT)
Dept: RADIOLOGY | Facility: HOSPITAL | Age: 65
Discharge: HOME | End: 2025-08-26
Payer: MEDICARE

## 2025-08-26 VITALS — BODY MASS INDEX: 35.94 KG/M2 | WEIGHT: 229 LBS | HEIGHT: 67 IN

## 2025-08-26 DIAGNOSIS — Z12.31 SCREENING MAMMOGRAM FOR BREAST CANCER: ICD-10-CM

## 2025-08-26 DIAGNOSIS — M85.852 OSTEOPENIA OF NECK OF LEFT FEMUR: ICD-10-CM

## 2025-08-26 PROCEDURE — 77067 SCR MAMMO BI INCL CAD: CPT | Performed by: RADIOLOGY

## 2025-08-26 PROCEDURE — 77080 DXA BONE DENSITY AXIAL: CPT

## 2025-08-26 PROCEDURE — 77067 SCR MAMMO BI INCL CAD: CPT

## 2025-08-26 PROCEDURE — 77063 BREAST TOMOSYNTHESIS BI: CPT | Performed by: RADIOLOGY

## 2025-08-26 PROCEDURE — 77080 DXA BONE DENSITY AXIAL: CPT | Performed by: STUDENT IN AN ORGANIZED HEALTH CARE EDUCATION/TRAINING PROGRAM

## 2025-09-02 DIAGNOSIS — F32.9 MAJOR DEPRESSIVE DISORDER, SINGLE EPISODE, UNSPECIFIED: ICD-10-CM

## 2025-09-02 RX ORDER — CITALOPRAM 20 MG/1
20 TABLET ORAL DAILY
Qty: 90 TABLET | Refills: 0 | Status: SHIPPED | OUTPATIENT
Start: 2025-09-02